# Patient Record
Sex: FEMALE | Race: BLACK OR AFRICAN AMERICAN | NOT HISPANIC OR LATINO | Employment: FULL TIME | URBAN - METROPOLITAN AREA
[De-identification: names, ages, dates, MRNs, and addresses within clinical notes are randomized per-mention and may not be internally consistent; named-entity substitution may affect disease eponyms.]

---

## 2021-01-05 ENCOUNTER — DOCUMENTATION ONLY (OUTPATIENT)
Dept: ONCOLOGY | Facility: CLINIC | Age: 51
End: 2021-01-05

## 2021-01-05 ENCOUNTER — TRANSCRIBE ORDERS (OUTPATIENT)
Dept: OTHER | Age: 51
End: 2021-01-05

## 2021-01-05 DIAGNOSIS — D70.9 NEUTROPENIA, UNSPECIFIED (H): Primary | ICD-10-CM

## 2021-01-05 NOTE — PROGRESS NOTES
Action    Action Taken 1/5/21: Records from [facility] pulled thru [facility]     RECORDS STATUS - ALL OTHER DIAGNOSIS      RECORDS RECEIVED FROM: [facility]   DATE RECEIVED:    NOTES STATUS DETAILS   OFFICE NOTE from referring provider GLEN - [facility]: 12/31/20   OFFICE NOTE from medical oncologist     DISCHARGE SUMMARY from hospital     DISCHARGE REPORT from the ER     OPERATIVE REPORT     MEDICATION LIST     CLINICAL TRIAL TREATMENTS TO DATE     LABS     PATHOLOGY REPORTS     ANYTHING RELATED TO DIAGNOSIS Epic/[facility] 12/17/20   GENONOMIC TESTING     TYPE:     IMAGING (NEED IMAGES & REPORT)     XR Requested 1/5 12/28/20: [facility]    12/10/20: [facility]   CT SCANS     MRI Requested 1/5 12/26/20: [facility]   MAMMO     ULTRASOUND Requested 1/5 12/26/20: [facility]   PET

## 2023-10-05 ENCOUNTER — APPOINTMENT (OUTPATIENT)
Dept: CT IMAGING | Facility: CLINIC | Age: 53
End: 2023-10-05
Attending: EMERGENCY MEDICINE
Payer: COMMERCIAL

## 2023-10-05 ENCOUNTER — APPOINTMENT (OUTPATIENT)
Dept: ULTRASOUND IMAGING | Facility: CLINIC | Age: 53
End: 2023-10-05
Attending: EMERGENCY MEDICINE
Payer: COMMERCIAL

## 2023-10-05 ENCOUNTER — HOSPITAL ENCOUNTER (EMERGENCY)
Facility: CLINIC | Age: 53
Discharge: HOME | End: 2023-10-05
Attending: EMERGENCY MEDICINE | Admitting: EMERGENCY MEDICINE
Payer: COMMERCIAL

## 2023-10-05 VITALS
RESPIRATION RATE: 18 BRPM | SYSTOLIC BLOOD PRESSURE: 126 MMHG | DIASTOLIC BLOOD PRESSURE: 80 MMHG | TEMPERATURE: 97 F | OXYGEN SATURATION: 99 % | WEIGHT: 159.17 LBS | HEART RATE: 64 BPM

## 2023-10-05 DIAGNOSIS — M79.89 LEFT LEG SWELLING: ICD-10-CM

## 2023-10-05 DIAGNOSIS — R91.8 PULMONARY NODULES: ICD-10-CM

## 2023-10-05 DIAGNOSIS — R07.9 CHEST PAIN, UNSPECIFIED TYPE: Primary | ICD-10-CM

## 2023-10-05 LAB
ALBUMIN SERPL BCG-MCNC: 4.4 G/DL (ref 3.5–5.2)
ALP SERPL-CCNC: 136 U/L (ref 35–104)
ALT SERPL W P-5'-P-CCNC: 15 U/L (ref 0–50)
ANION GAP SERPL CALCULATED.3IONS-SCNC: 7 MMOL/L (ref 7–15)
AST SERPL W P-5'-P-CCNC: 19 U/L (ref 0–45)
BASO+EOS+MONOS # BLD AUTO: ABNORMAL 10*3/UL
BASO+EOS+MONOS NFR BLD AUTO: ABNORMAL %
BASOPHILS # BLD AUTO: 0 10E3/UL (ref 0–0.2)
BASOPHILS NFR BLD AUTO: 1 %
BILIRUB SERPL-MCNC: 0.5 MG/DL
BUN SERPL-MCNC: 12.9 MG/DL (ref 6–20)
CALCIUM SERPL-MCNC: 9.1 MG/DL (ref 8.6–10)
CHLORIDE SERPL-SCNC: 105 MMOL/L (ref 98–107)
CREAT SERPL-MCNC: 0.62 MG/DL (ref 0.51–0.95)
DEPRECATED HCO3 PLAS-SCNC: 29 MMOL/L (ref 22–29)
EGFRCR SERPLBLD CKD-EPI 2021: >90 ML/MIN/1.73M2
EOSINOPHIL # BLD AUTO: 0.2 10E3/UL (ref 0–0.7)
EOSINOPHIL NFR BLD AUTO: 7 %
ERYTHROCYTE [DISTWIDTH] IN BLOOD BY AUTOMATED COUNT: 12.6 % (ref 10–15)
GLUCOSE SERPL-MCNC: 89 MG/DL (ref 70–99)
HCT VFR BLD AUTO: 38.7 % (ref 35–47)
HGB BLD-MCNC: 12.7 G/DL (ref 11.7–15.7)
HOLD SPECIMEN: NORMAL
HOLD SPECIMEN: NORMAL
IMM GRANULOCYTES # BLD: 0 10E3/UL
IMM GRANULOCYTES NFR BLD: 0 %
LYMPHOCYTES # BLD AUTO: 2 10E3/UL (ref 0.8–5.3)
LYMPHOCYTES NFR BLD AUTO: 58 %
MCH RBC QN AUTO: 29.3 PG (ref 26.5–33)
MCHC RBC AUTO-ENTMCNC: 32.8 G/DL (ref 31.5–36.5)
MCV RBC AUTO: 89 FL (ref 78–100)
MONOCYTES # BLD AUTO: 0.3 10E3/UL (ref 0–1.3)
MONOCYTES NFR BLD AUTO: 9 %
NEUTROPHILS # BLD AUTO: 0.8 10E3/UL (ref 1.6–8.3)
NEUTROPHILS NFR BLD AUTO: 25 %
NRBC # BLD AUTO: 0 10E3/UL
NRBC BLD AUTO-RTO: 0 /100
NT-PROBNP SERPL-MCNC: 47 PG/ML (ref 0–900)
PLATELET # BLD AUTO: 225 10E3/UL (ref 150–450)
POTASSIUM SERPL-SCNC: 3.8 MMOL/L (ref 3.4–5.3)
PROT SERPL-MCNC: 7.1 G/DL (ref 6.4–8.3)
RBC # BLD AUTO: 4.33 10E6/UL (ref 3.8–5.2)
SODIUM SERPL-SCNC: 141 MMOL/L (ref 135–145)
TROPONIN T SERPL HS-MCNC: <6 NG/L
TROPONIN T SERPL HS-MCNC: <6 NG/L
WBC # BLD AUTO: 3.4 10E3/UL (ref 4–11)

## 2023-10-05 PROCEDURE — 80053 COMPREHEN METABOLIC PANEL: CPT | Performed by: EMERGENCY MEDICINE

## 2023-10-05 PROCEDURE — 99285 EMERGENCY DEPT VISIT HI MDM: CPT | Mod: 25

## 2023-10-05 PROCEDURE — 83880 ASSAY OF NATRIURETIC PEPTIDE: CPT | Performed by: EMERGENCY MEDICINE

## 2023-10-05 PROCEDURE — 36415 COLL VENOUS BLD VENIPUNCTURE: CPT | Performed by: EMERGENCY MEDICINE

## 2023-10-05 PROCEDURE — 93970 EXTREMITY STUDY: CPT

## 2023-10-05 PROCEDURE — 250N000011 HC RX IP 250 OP 636: Performed by: EMERGENCY MEDICINE

## 2023-10-05 PROCEDURE — 84484 ASSAY OF TROPONIN QUANT: CPT | Performed by: EMERGENCY MEDICINE

## 2023-10-05 PROCEDURE — 250N000009 HC RX 250: Performed by: EMERGENCY MEDICINE

## 2023-10-05 PROCEDURE — 71275 CT ANGIOGRAPHY CHEST: CPT

## 2023-10-05 PROCEDURE — 93005 ELECTROCARDIOGRAM TRACING: CPT

## 2023-10-05 PROCEDURE — 85025 COMPLETE CBC W/AUTO DIFF WBC: CPT | Performed by: EMERGENCY MEDICINE

## 2023-10-05 RX ORDER — IOPAMIDOL 755 MG/ML
500 INJECTION, SOLUTION INTRAVASCULAR ONCE
Status: COMPLETED | OUTPATIENT
Start: 2023-10-05 | End: 2023-10-05

## 2023-10-05 RX ADMIN — SODIUM CHLORIDE 78 ML: 9 INJECTION, SOLUTION INTRAVENOUS at 14:48

## 2023-10-05 RX ADMIN — IOPAMIDOL 80 ML: 755 INJECTION, SOLUTION INTRAVENOUS at 14:48

## 2023-10-05 ASSESSMENT — ACTIVITIES OF DAILY LIVING (ADL)
ADLS_ACUITY_SCORE: 35
ADLS_ACUITY_SCORE: 35

## 2023-10-05 ASSESSMENT — ENCOUNTER SYMPTOMS
FEVER: 0
NAUSEA: 0
CHILLS: 0
HEMATURIA: 0
HEADACHES: 1
SHORTNESS OF BREATH: 0
COUGH: 0
LIGHT-HEADEDNESS: 0
DYSURIA: 0
DIZZINESS: 0
NECK STIFFNESS: 0
RHINORRHEA: 0
ABDOMINAL PAIN: 0
NUMBNESS: 0
NECK PAIN: 0

## 2023-10-05 NOTE — ED PROVIDER NOTES
History     Chief Complaint:  Chest Pain       The history is provided by the patient. No  was used.      Gregory Richard is a 52 year old female who presents with sharp, intermittent chest pain. Patient reports she experienced severe chest pain that began yesterday at 2100 with a 6/10 severity. She reports her chest pain radiates to her mid-back, but that has improved and currently experiences 1/10 pain. Patient reports this morning she woke up with a single small blood clot on the side of her mouth. She explains the blood was minimal. She is uncertain of the origin, but denies it being from cough or vomitus. Patient reports her pain does not exacerbate or improve with anything. Patient visited urgent care earlier today and was given aspirin, and recommended to the ED. She notes that she experienced a similar pain one year prior and was evaluated but no cause of pain was found. Reports history of hyperlipidemia and denies any history of heart or lung problems. Patient does not take any routine medications. Denies lightheadedness but feels a light headache. No family history of heart stents, bypass, MI or stroke under the age of 60. Never had a stress test in the past. She denies smoking. She denies hemoptysis. No history of aortic disease or PE/DVT.    Incidentally, she also notes that she has been experience lower extremity swelling bilaterally, and an odd sensation internally that she cannot describe. Sometimes there's associated pruritus worse at night time.    Review of Systems   Constitutional:  Negative for chills and fever.   HENT:  Negative for congestion and rhinorrhea.    Respiratory:  Negative for cough and shortness of breath.    Cardiovascular:  Positive for chest pain and leg swelling.   Gastrointestinal:  Negative for abdominal pain and nausea.   Genitourinary:  Negative for dysuria and hematuria.   Musculoskeletal:  Negative for neck pain and neck stiffness.   Neurological:   Positive for headaches. Negative for dizziness, light-headedness and numbness.   All other systems reviewed and are negative.      Independent Historian:   None - Patient Only    Review of External Notes:   [facility] visit note from today     Medications:    The patient is currently on no regular medications.    Past Medical History:    Hyperlipidemia     Physical Exam   Patient Vitals for the past 24 hrs:   BP Temp Temp src Pulse Resp SpO2 Weight   10/05/23 1805 126/80 -- -- 64 18 99 % --   10/05/23 1441 109/67 -- -- 54 16 100 % --   10/05/23 1201 116/81 97  F (36.1  C) Temporal 65 18 100 % 72.2 kg (159 lb 2.8 oz)      Constitutional:       General: Not in acute distress.     Appearance: Normal appearance.   HENT:      Head: Normocephalic and atraumatic.   Eyes:      Extraocular Movements: Extraocular movements intact.      Conjunctiva/sclera: Conjunctivae normal.   Cardiovascular:      Rate and Rhythm: Regular rate and rhythm.  Pulmonary:      Effort: Pulmonary effort is normal. No respiratory distress.      Breath sounds: Normal breath sounds.  Abdominal:      General: Abdomen is flat. There is no distension.      Palpations: Abdomen is soft.      Tenderness: There is no abdominal tenderness.   Musculoskeletal:      Cervical back: Normal range of motion. No rigidity.      Right lower leg: No edema.      Left lower leg: No edema.   Skin:     General: Skin is warm and dry.   Neurological:      General: No focal deficit present.      Mental Status: Alert and oriented to person, place, and time.   Psychiatric:         Mood and Affect: Mood normal.         Behavior: Behavior normal.    Emergency Department Course   ECG   See ED course for independent interpretation.     Imaging:  US Lower Extremity Venous Duplex Bilateral   Final Result   IMPRESSION:   1.  No deep venous thrombosis in the bilateral lower extremities.      NITISH GERBER MD            SYSTEM ID:  [id number]      CT Chest Pulmonary Embolism w Contrast   Final  Result   IMPRESSION:   1.  No pulmonary artery embolism.   2.  No acute pulmonary findings. No pneumonic infiltrate or pleural   effusion.   3.  Bilateral 3 to 4 mm pulmonary nodules. These can be followed per   the guidelines below.         REFERENCE:   Guidelines for Management of Incidental Pulmonary Nodules Detected on   CT Images: From the Fleischner Society 2017.    Guidelines apply to incidental nodules in patients who are 35 years or   older.   Guidelines do not apply to lung cancer screening, patients with   immunosuppression, or patients with known primary cancer.      MULTIPLE NODULES   Nodule size <6 mm   Low-risk patients: No follow-up needed.   High-risk patients: Optional follow-up at 12 months.            LISETTE VARGAS MD            SYSTEM ID:  [id number]         Report per radiology    Laboratory:  Labs Ordered and Resulted from Time of ED Arrival to Time of ED Departure   COMPREHENSIVE METABOLIC PANEL - Abnormal       Result Value    Sodium 141      Potassium 3.8      Carbon Dioxide (CO2) 29      Anion Gap 7      Urea Nitrogen 12.9      Creatinine 0.62      GFR Estimate >90      Calcium 9.1      Chloride 105      Glucose 89      Alkaline Phosphatase 136 (*)     AST 19      ALT 15      Protein Total 7.1      Albumin 4.4      Bilirubin Total 0.5     CBC WITH PLATELETS AND DIFFERENTIAL - Abnormal    WBC Count 3.4 (*)     RBC Count 4.33      Hemoglobin 12.7      Hematocrit 38.7      MCV 89      MCH 29.3      MCHC 32.8      RDW 12.6      Platelet Count 225      % Neutrophils 25      % Lymphocytes 58      % Monocytes 9      Mids % (Monos, Eos, Basos)        % Eosinophils 7      % Basophils 1      % Immature Granulocytes 0      NRBCs per 100 WBC 0      Absolute Neutrophils 0.8 (*)     Absolute Lymphocytes 2.0      Absolute Monocytes 0.3      Mids Abs (Monos, Eos, Basos)        Absolute Eosinophils 0.2      Absolute Basophils 0.0      Absolute Immature Granulocytes 0.0      Absolute NRBCs 0.0      TROPONIN T, HIGH SENSITIVITY - Normal    Troponin T, High Sensitivity <6     NT PROBNP INPATIENT - Normal    N terminal Pro BNP Inpatient 47     TROPONIN T, HIGH SENSITIVITY - Normal    Troponin T, High Sensitivity <6        Emergency Department Course & Assessments:       Interventions:  Medications   CT Scan Flush (78 mLs Intravenous $Given 10/5/23 1448)   iopamidol (ISOVUE-370) solution 500 mL (80 mLs Intravenous $Given 10/5/23 1448)        Independent Interpretation (X-rays, CTs, rhythm strip):  None     Assessments/Consultations/Discussion of Management or Tests:  ED Course as of 10/05/23 2159   Thu Oct 05, 2023   1410 EKG 12-lead, tracing only  Normal sinus rhythm.  Rate of 58.  Normal CO and QRS.  Normal QTc.  No acute ST elevation or depression.  No prior ECG for comparison.   1412 I obtained history and examined the patient as noted above.      1744 I updated the patient and  regarding patient's lab and image findings.  Patient not complaining of any chest pain at this time and feels well.  Heart score of 3.  Updated on incidental finding of pulmonary nodules, will discharge patient to primary care follow-up.  No indication for stress testing or cardiology referral at this time and patient and  declined as well. Unclear cause of chest pain today, but no evidence of PE, dissection, or ACS. Discussed return precautions.  Answered all questions.  Patient and  voiced understanding agreement with plan.     Social Determinants of Health affecting care:   None    Disposition:  The patient was discharged to home.     Impression & Plan    CMS Diagnoses: None    HEART Score  Background  Calculates the overall risk of adverse event in patient's presenting with chest pain.  Based on 5 criteria (each assigned 0-2 points) including suspiciousness of history, EKG, age, risk factors and troponin.    Data  52 year old female  does not have a problem list on file.   has no history on file for tobacco  use.  family history is not on file.  Recent Labs   Lab 10/05/23  1558 10/05/23  1434   CTROPT <6 <6     Criteria   0-2 points for each of 5 items (maximum of 10 points):  Score 1- History moderately suspicious for coronary syndrome  Score 0- EKG Normal  Score 1- Age 45 to 65 years old  Score 1- One to 2 risk factors for atherosclerotic disease  Score 0- Within normal limits for troponin levels  Interpretation  Risk of adverse outcome  Heart Score: 3  Total Score 0-3- Adverse Outcome Risk 2.5% - Supports early discharge with appropriate follow-up    Medical Decision Makin-year-old female as described above presents to the emergency department for chest pain with radiation to back.  Patient has had similar pains in the past before and was evaluated at outpatient facility and was told to have negative work-up.  Patient hemodynamically stable at time evaluation.  Afebrile.  Currently the chest pain significantly improved.  Received aspirin at outside facility.  Broad differential diagnosis considered includes, but not limited to, pulmonary embolism, thoracic aortic dissection, ACS, and lung malignancy.  Will obtain CT PE for evaluation of above discussed differentials.  2 set cardiac enzymes.  Further restratification due to heart score pending troponin and completion work-up.  BNP given lower extremity swelling.  Ultrasound duplex of lower extremity given on cramp-like sensation in lower extremities.  Discussed care plan with patient who voiced understanding and agreement with plan.  Answered all questions.  Additional work-up and orders as listed in chart.    Please refer to ED course above for details on the patient's treatment course and any changes or updates in care plan beyond my initial evaluation and MDM.    Diagnosis:    ICD-10-CM    1. Chest pain, unspecified type  R07.9       2. Pulmonary nodules  R91.8 Adult Oncology/Hematology  Referral      3. Left leg swelling  M79.89          Scribe  Disclosure:  I, Jewels William, am serving as a scribe at 3:17 PM on 10/5/2023 to document services personally performed by Patrick Oliver DO based on my observations and the provider's statements to me.     I, Starr Graham, am serving as a scribe at 4:40 PM on 10/5/2023 to document services personally performed by Patrick Oliver DO based on my observations and the provider's statements to me.    10/5/2023   Patrick Oliver DO Yeh, Ferris, DO  10/05/23 2150

## 2023-10-05 NOTE — ED TRIAGE NOTES
Patient reports with chest pain since last night at 9 pm. She describes the chest pain as sharp and radiating, substernal radiating to her shoulder, The chest pain was severe last night and has since subsided rates the pain 6/10. Denies shortness or dizziness.

## 2023-10-06 ENCOUNTER — PATIENT OUTREACH (OUTPATIENT)
Dept: ONCOLOGY | Facility: CLINIC | Age: 53
End: 2023-10-06
Payer: COMMERCIAL

## 2023-10-06 LAB
ATRIAL RATE - MUSE: 58 BPM
DIASTOLIC BLOOD PRESSURE - MUSE: NORMAL MMHG
INTERPRETATION ECG - MUSE: NORMAL
P AXIS - MUSE: 31 DEGREES
PR INTERVAL - MUSE: 146 MS
QRS DURATION - MUSE: 82 MS
QT - MUSE: 392 MS
QTC - MUSE: 384 MS
R AXIS - MUSE: 32 DEGREES
SYSTOLIC BLOOD PRESSURE - MUSE: NORMAL MMHG
T AXIS - MUSE: 31 DEGREES
VENTRICULAR RATE- MUSE: 58 BPM

## 2023-10-06 NOTE — PROGRESS NOTES
New IP (Interventional Pulmonology) referral rec'd.  Chart reviewed.      New Patient: Interventional Pulmonary (Lung nodule) Nurse Navigator Note    Referring provider:   Patrick Oliver DO [facility] [phone]     Diagnoses      Referred to (specialty): Interventional Pulmonary (Lung nodule)    Requested provider (if applicable): n/a    Date Referral Received: 10/6/2023    Evaluation for :  Lung nodule    Clinical History (per Nurse review of records provided):    **BOOK MARKED**  CT CHEST PULMONARY EMBOLISM W CONTRAST 10/5/2023 2:58 PM     CLINICAL HISTORY: pleuritic/sharp chest pain with radiation to back  TECHNIQUE: CT angiogram chest during arterial phase injection IV  contrast. 2D and 3D MIP reconstructions were performed by the CT  technologist. Dose reduction techniques were used.      CONTRAST: 80mL Isovue-370     COMPARISON: None.     FINDINGS:  ANGIOGRAM CHEST: Pulmonary arteries are normal caliber and negative  for pulmonary emboli. Thoracic aorta is negative for dissection. No CT  evidence of right heart strain. Incidental ductus diverticulum of the  thoracic aorta.     LUNGS AND PLEURA: 4 mm right middle lobe nodules image numbers 118 and  156 series 7. 3 mm left lower lobe subpleural nodule image 172. 4 mm  left upper lobe nodule image 86. 3 mm left major fissural  intrapulmonary lymph node. Mild bibasilar scarring. No focal pneumonic  consolidation or pleural effusion. No pneumothorax.     MEDIASTINUM/AXILLAE: No thoracic adenopathy. Normal heart size and no  pericardial effusion.     CORONARY ARTERY CALCIFICATION: None.     UPPER ABDOMEN: Normal.     MUSCULOSKELETAL: Normal.                                                                      IMPRESSION:  1.  No pulmonary artery embolism.  2.  No acute pulmonary findings. No pneumonic infiltrate or pleural  effusion.  3.  Bilateral 3 to 4 mm pulmonary nodules. These can be followed per  the guidelines  below.      Records Location (Care Everywhere, Media, etc.): Epic     Records Needed: none    Additional testing needed prior to consult: NONE

## 2023-10-10 PROBLEM — M53.3 COCCYX PAIN: Status: ACTIVE | Noted: 2019-06-14

## 2023-10-10 PROBLEM — R60.0 PEDAL EDEMA: Status: ACTIVE | Noted: 2019-06-14

## 2023-10-10 PROBLEM — M75.102 TEAR OF LEFT SUPRASPINATUS TENDON: Status: ACTIVE | Noted: 2021-01-01

## 2023-10-10 RX ORDER — IBUPROFEN 600 MG/1
600 TABLET, FILM COATED ORAL
COMMUNITY
Start: 2023-09-26

## 2023-10-14 ENCOUNTER — HEALTH MAINTENANCE LETTER (OUTPATIENT)
Age: 53
End: 2023-10-14

## 2023-11-16 NOTE — TELEPHONE ENCOUNTER
RECORDS STATUS - ALL OTHER DIAGNOSIS      [facility]   DATE RECEIVED: 11/16   NOTES STATUS DETAILS   OFFICE NOTE from referring provider Epic Dr. Patrick Oliver via ED visit 10/5/23   DISCHARGE REPORT from the ER [facility] 10/5/23   MEDICATION LIST [facility] 9/26/23   LABS     ANYTHING RELATED TO DIAGNOSIS Epic/ 10/27/23   IMAGING (NEED IMAGES & REPORT)     CT SCANS PACS 10/5/23: [facility]   MRI PACS 7/3/23, 5/24/22, 12/28/12: [facility]

## 2023-11-20 PROBLEM — R73.03 PRE-DIABETES: Status: ACTIVE | Noted: 2023-10-29

## 2023-11-20 PROBLEM — D72.819 LEUKOPENIA: Status: ACTIVE | Noted: 2023-10-27

## 2023-11-20 PROBLEM — R91.1 PULMONARY NODULE: Status: ACTIVE | Noted: 2023-10-27

## 2023-11-20 PROBLEM — I95.0 IDIOPATHIC HYPOTENSION: Status: ACTIVE | Noted: 2023-10-27

## 2023-12-07 ENCOUNTER — PRE VISIT (OUTPATIENT)
Dept: PULMONOLOGY | Facility: CLINIC | Age: 53
End: 2023-12-07
Payer: COMMERCIAL

## 2023-12-08 ENCOUNTER — PATIENT OUTREACH (OUTPATIENT)
Dept: ONCOLOGY | Facility: CLINIC | Age: 53
End: 2023-12-08
Payer: COMMERCIAL

## 2023-12-08 NOTE — PROGRESS NOTES
I rec'd a message that this patient was a no show.  I sent an IB message to the NPS (new patient scheduling) team asking that they reach out to reschedule patient for another appt --ok to schedule 2-3 months out, non-urgent

## 2023-12-21 NOTE — TELEPHONE ENCOUNTER
RECORDS STATUS - ALL OTHER DIAGNOSIS             [facility]   DATE RECEIVED: 12/21   NOTES STATUS DETAILS   OFFICE NOTE from referring provider Epic Dr. Patrick Oliver via ED visit 10/5/23   DISCHARGE REPORT from the ER [facility] 10/5/23   MEDICATION LIST [facility] 9/26/23   LABS       ANYTHING RELATED TO DIAGNOSIS Epic/ 10/27/23   IMAGING (NEED IMAGES & REPORT)       CT SCANS PACS 10/5/23: [facility]   MRI PACS 7/3/23, 5/24/22, 12/28/12: [facility]

## 2024-03-06 DIAGNOSIS — R91.1 PULMONARY NODULE: Primary | ICD-10-CM

## 2024-03-07 ENCOUNTER — CARE COORDINATION (OUTPATIENT)
Dept: PULMONOLOGY | Facility: CLINIC | Age: 54
End: 2024-03-07
Payer: COMMERCIAL

## 2024-03-07 NOTE — PROGRESS NOTES
"Upon chart review for upcoming appointment on 3/20 with Dr. Alas, RNCC noted that pt did not have CT within the past 3 months. [facility]. Msg received from  stating that patient did not want to schedule the CT because \"it is too expensive.\" RNCC reviewed patients chart further and per appointment on 10/27/2023 (post ED follow up) with Luisana Wise PA-C, provider wrote in her office summary: \"Discussed no follow up needed for pulmonary nodules, pt may request follow up in 1 year anyway. This may not be covered by insurance.\"    Msg sent to Dr. Alas who is set to see patient on 3/20 regarding above. Dr. Alas stated \"She could follow-up with her PCP. Thanks Damir\"    MyChart message sent to pt regarding no need to follow-up with IP clinic and pt can follow-up with pcp. Msg sent to scheduling team to cancel 3/20 appointment. Msg sent to ASHWIN Pa regarding the pt not needing follow-up with IP clinic and referral can be canceled.   "

## 2024-03-20 ENCOUNTER — PRE VISIT (OUTPATIENT)
Dept: PULMONOLOGY | Facility: CLINIC | Age: 54
End: 2024-03-20
Payer: COMMERCIAL

## 2024-07-21 ENCOUNTER — HOSPITAL ENCOUNTER (EMERGENCY)
Facility: CLINIC | Age: 54
Discharge: HOME | End: 2024-07-21
Attending: EMERGENCY MEDICINE | Admitting: EMERGENCY MEDICINE
Payer: COMMERCIAL

## 2024-07-21 ENCOUNTER — APPOINTMENT (OUTPATIENT)
Dept: CT IMAGING | Facility: CLINIC | Age: 54
End: 2024-07-21
Attending: EMERGENCY MEDICINE
Payer: COMMERCIAL

## 2024-07-21 ENCOUNTER — APPOINTMENT (OUTPATIENT)
Dept: MRI IMAGING | Facility: CLINIC | Age: 54
End: 2024-07-21
Attending: EMERGENCY MEDICINE
Payer: COMMERCIAL

## 2024-07-21 VITALS
DIASTOLIC BLOOD PRESSURE: 70 MMHG | HEIGHT: 64 IN | HEART RATE: 60 BPM | BODY MASS INDEX: 28 KG/M2 | TEMPERATURE: 97.7 F | RESPIRATION RATE: 10 BRPM | OXYGEN SATURATION: 100 % | WEIGHT: 164 LBS | SYSTOLIC BLOOD PRESSURE: 125 MMHG

## 2024-07-21 DIAGNOSIS — M54.2 NECK PAIN: ICD-10-CM

## 2024-07-21 DIAGNOSIS — R11.2 NAUSEA AND VOMITING, UNSPECIFIED VOMITING TYPE: ICD-10-CM

## 2024-07-21 DIAGNOSIS — R51.9 ACUTE NONINTRACTABLE HEADACHE, UNSPECIFIED HEADACHE TYPE: ICD-10-CM

## 2024-07-21 DIAGNOSIS — R42 VERTIGO: ICD-10-CM

## 2024-07-21 LAB
ANION GAP SERPL CALCULATED.3IONS-SCNC: 10 MMOL/L (ref 7–15)
APTT PPP: 34 SECONDS (ref 22–38)
BASOPHILS # BLD AUTO: 0 10E3/UL (ref 0–0.2)
BASOPHILS NFR BLD AUTO: 1 %
BUN SERPL-MCNC: 13.4 MG/DL (ref 6–20)
CALCIUM SERPL-MCNC: 8.6 MG/DL (ref 8.8–10.4)
CHLORIDE SERPL-SCNC: 102 MMOL/L (ref 98–107)
CREAT SERPL-MCNC: 0.64 MG/DL (ref 0.51–0.95)
EGFRCR SERPLBLD CKD-EPI 2021: >90 ML/MIN/1.73M2
EOSINOPHIL # BLD AUTO: 0.4 10E3/UL (ref 0–0.7)
EOSINOPHIL NFR BLD AUTO: 10 %
ERYTHROCYTE [DISTWIDTH] IN BLOOD BY AUTOMATED COUNT: 12.5 % (ref 10–15)
FLUAV RNA SPEC QL NAA+PROBE: NEGATIVE
FLUBV RNA RESP QL NAA+PROBE: NEGATIVE
GLUCOSE BLDC GLUCOMTR-MCNC: 100 MG/DL (ref 70–99)
GLUCOSE SERPL-MCNC: 110 MG/DL (ref 70–99)
HCG SERPL QL: NEGATIVE
HCO3 SERPL-SCNC: 26 MMOL/L (ref 22–29)
HCT VFR BLD AUTO: 39.9 % (ref 35–47)
HGB BLD-MCNC: 12.7 G/DL (ref 11.7–15.7)
IMM GRANULOCYTES # BLD: 0 10E3/UL
IMM GRANULOCYTES NFR BLD: 0 %
INR PPP: 1 (ref 0.85–1.15)
LYMPHOCYTES # BLD AUTO: 2 10E3/UL (ref 0.8–5.3)
LYMPHOCYTES NFR BLD AUTO: 53 %
MCH RBC QN AUTO: 28.5 PG (ref 26.5–33)
MCHC RBC AUTO-ENTMCNC: 31.8 G/DL (ref 31.5–36.5)
MCV RBC AUTO: 90 FL (ref 78–100)
MONOCYTES # BLD AUTO: 0.4 10E3/UL (ref 0–1.3)
MONOCYTES NFR BLD AUTO: 10 %
NEUTROPHILS # BLD AUTO: 1 10E3/UL (ref 1.6–8.3)
NEUTROPHILS NFR BLD AUTO: 27 %
NRBC # BLD AUTO: 0 10E3/UL
NRBC BLD AUTO-RTO: 0 /100
PLATELET # BLD AUTO: 230 10E3/UL (ref 150–450)
POTASSIUM SERPL-SCNC: 3.9 MMOL/L (ref 3.4–5.3)
RBC # BLD AUTO: 4.45 10E6/UL (ref 3.8–5.2)
RSV RNA SPEC NAA+PROBE: NEGATIVE
SARS-COV-2 RNA RESP QL NAA+PROBE: NEGATIVE
SODIUM SERPL-SCNC: 138 MMOL/L (ref 135–145)
TROPONIN T SERPL HS-MCNC: 10 NG/L
WBC # BLD AUTO: 3.7 10E3/UL (ref 4–11)

## 2024-07-21 PROCEDURE — 85025 COMPLETE CBC W/AUTO DIFF WBC: CPT | Performed by: EMERGENCY MEDICINE

## 2024-07-21 PROCEDURE — 36415 COLL VENOUS BLD VENIPUNCTURE: CPT | Performed by: EMERGENCY MEDICINE

## 2024-07-21 PROCEDURE — 70496 CT ANGIOGRAPHY HEAD: CPT

## 2024-07-21 PROCEDURE — 255N000002 HC RX 255 OP 636: Performed by: EMERGENCY MEDICINE

## 2024-07-21 PROCEDURE — 82962 GLUCOSE BLOOD TEST: CPT

## 2024-07-21 PROCEDURE — 250N000009 HC RX 250: Performed by: EMERGENCY MEDICINE

## 2024-07-21 PROCEDURE — 250N000011 HC RX IP 250 OP 636: Performed by: EMERGENCY MEDICINE

## 2024-07-21 PROCEDURE — 70450 CT HEAD/BRAIN W/O DYE: CPT | Mod: XU

## 2024-07-21 PROCEDURE — 85610 PROTHROMBIN TIME: CPT | Performed by: EMERGENCY MEDICINE

## 2024-07-21 PROCEDURE — 250N000013 HC RX MED GY IP 250 OP 250 PS 637: Performed by: EMERGENCY MEDICINE

## 2024-07-21 PROCEDURE — 96374 THER/PROPH/DIAG INJ IV PUSH: CPT | Mod: 59

## 2024-07-21 PROCEDURE — 84703 CHORIONIC GONADOTROPIN ASSAY: CPT | Performed by: EMERGENCY MEDICINE

## 2024-07-21 PROCEDURE — A9585 GADOBUTROL INJECTION: HCPCS | Performed by: EMERGENCY MEDICINE

## 2024-07-21 PROCEDURE — 70553 MRI BRAIN STEM W/O & W/DYE: CPT

## 2024-07-21 PROCEDURE — 85730 THROMBOPLASTIN TIME PARTIAL: CPT | Performed by: EMERGENCY MEDICINE

## 2024-07-21 PROCEDURE — 87637 SARSCOV2&INF A&B&RSV AMP PRB: CPT | Performed by: EMERGENCY MEDICINE

## 2024-07-21 PROCEDURE — 80048 BASIC METABOLIC PNL TOTAL CA: CPT | Performed by: EMERGENCY MEDICINE

## 2024-07-21 PROCEDURE — 99207 PR NO BILLABLE SERVICE THIS VISIT: CPT | Performed by: STUDENT IN AN ORGANIZED HEALTH CARE EDUCATION/TRAINING PROGRAM

## 2024-07-21 PROCEDURE — 84484 ASSAY OF TROPONIN QUANT: CPT | Performed by: EMERGENCY MEDICINE

## 2024-07-21 PROCEDURE — 96361 HYDRATE IV INFUSION ADD-ON: CPT

## 2024-07-21 PROCEDURE — 93005 ELECTROCARDIOGRAM TRACING: CPT

## 2024-07-21 PROCEDURE — 99285 EMERGENCY DEPT VISIT HI MDM: CPT | Mod: 25

## 2024-07-21 PROCEDURE — 258N000003 HC RX IP 258 OP 636: Performed by: EMERGENCY MEDICINE

## 2024-07-21 RX ORDER — MECLIZINE HYDROCHLORIDE 25 MG/1
25 TABLET ORAL EVERY 6 HOURS PRN
Qty: 12 TABLET | Refills: 0 | Status: SHIPPED | OUTPATIENT
Start: 2024-07-21 | End: 2024-07-24

## 2024-07-21 RX ORDER — ONDANSETRON 2 MG/ML
4 INJECTION INTRAMUSCULAR; INTRAVENOUS ONCE
Status: COMPLETED | OUTPATIENT
Start: 2024-07-21 | End: 2024-07-21

## 2024-07-21 RX ORDER — DIAZEPAM 10 MG/2ML
2.5 INJECTION, SOLUTION INTRAMUSCULAR; INTRAVENOUS ONCE
Status: COMPLETED | OUTPATIENT
Start: 2024-07-21 | End: 2024-07-21

## 2024-07-21 RX ORDER — MECLIZINE HYDROCHLORIDE 25 MG/1
25 TABLET ORAL ONCE
Status: COMPLETED | OUTPATIENT
Start: 2024-07-21 | End: 2024-07-21

## 2024-07-21 RX ORDER — IOPAMIDOL 755 MG/ML
67 INJECTION, SOLUTION INTRAVASCULAR ONCE
Status: COMPLETED | OUTPATIENT
Start: 2024-07-21 | End: 2024-07-21

## 2024-07-21 RX ORDER — ONDANSETRON 4 MG/1
4 TABLET, ORALLY DISINTEGRATING ORAL EVERY 8 HOURS PRN
Qty: 10 TABLET | Refills: 0 | Status: SHIPPED | OUTPATIENT
Start: 2024-07-21 | End: 2024-07-24

## 2024-07-21 RX ORDER — GADOBUTROL 604.72 MG/ML
7.5 INJECTION INTRAVENOUS ONCE
Status: COMPLETED | OUTPATIENT
Start: 2024-07-21 | End: 2024-07-21

## 2024-07-21 RX ADMIN — SODIUM CHLORIDE, PRESERVATIVE FREE 100 ML: 5 INJECTION INTRAVENOUS at 10:11

## 2024-07-21 RX ADMIN — MECLIZINE HYDROCHLORIDE 25 MG: 25 TABLET ORAL at 10:44

## 2024-07-21 RX ADMIN — ONDANSETRON 4 MG: 2 INJECTION INTRAMUSCULAR; INTRAVENOUS at 10:40

## 2024-07-21 RX ADMIN — GADOBUTROL 7.5 ML: 604.72 INJECTION INTRAVENOUS at 13:31

## 2024-07-21 RX ADMIN — IOPAMIDOL 67 ML: 755 INJECTION, SOLUTION INTRAVENOUS at 10:10

## 2024-07-21 RX ADMIN — SODIUM CHLORIDE, POTASSIUM CHLORIDE, SODIUM LACTATE AND CALCIUM CHLORIDE 1000 ML: 600; 310; 30; 20 INJECTION, SOLUTION INTRAVENOUS at 10:37

## 2024-07-21 ASSESSMENT — ACTIVITIES OF DAILY LIVING (ADL)
ADLS_ACUITY_SCORE: 35

## 2024-07-21 ASSESSMENT — COLUMBIA-SUICIDE SEVERITY RATING SCALE - C-SSRS
2. HAVE YOU ACTUALLY HAD ANY THOUGHTS OF KILLING YOURSELF IN THE PAST MONTH?: NO
1. IN THE PAST MONTH, HAVE YOU WISHED YOU WERE DEAD OR WISHED YOU COULD GO TO SLEEP AND NOT WAKE UP?: NO
6. HAVE YOU EVER DONE ANYTHING, STARTED TO DO ANYTHING, OR PREPARED TO DO ANYTHING TO END YOUR LIFE?: NO

## 2024-07-21 NOTE — ED PROVIDER NOTES
"  Emergency Department Note      History of Present Illness     Chief Complaint   Dizziness and Vomiting      HPI   Gregory Richard is a 53 year old female who presents to the ED for dizziness and vomiting. ***    Independent Historian   None    Review of External Notes   ***    Past Medical History     Medical History and Problem List   Tear of left supraspinatus tendon  Idiopathic hypotension  Leukopenia  Pre-diabetes  Pulmonary nodule  Endometritis     Medications   Tizanidine     Physical Exam     Patient Vitals for the past 24 hrs:   BP Temp Temp src Pulse Resp SpO2 Height Weight   07/21/24 0944 133/73 97.7  F (36.5  C) Temporal 73 18 98 % 1.626 m (5' 4\") 74.4 kg (164 lb)     Physical Exam  ***    Diagnostics     Lab Results   Labs Ordered and Resulted from Time of ED Arrival to Time of ED Departure   GLUCOSE BY METER - Abnormal       Result Value    GLUCOSE BY METER POCT 100 (*)    GLUCOSE MONITOR NURSING POCT   BASIC METABOLIC PANEL   INR   PARTIAL THROMBOPLASTIN TIME   TROPONIN T, HIGH SENSITIVITY   HCG QUALITATIVE PREGNANCY   INFLUENZA A/B, RSV, & SARS-COV2 PCR   CBC WITH PLATELETS AND DIFFERENTIAL       Imaging   CT Head w/o Contrast    (Results Pending)   CTA Head Neck with Contrast    (Results Pending)       EKG   ECG results from 07/21/24   EKG 12-lead, tracing only     Value    Systolic Blood Pressure     Diastolic Blood Pressure     Ventricular Rate 64    Atrial Rate 64    NC Interval 152    QRS Duration 82        QTc 408    P Axis 20    R AXIS 42    T Axis 28    Interpretation ECG      Sinus rhythm  Normal ECG  When compared with ECG of 05-OCT-2023 12:11,  No significant change was found  ECG taken at 1030, ECG read at 1037       Independent Interpretation   CT Head: No {CT Head:829361}.    ED Course      Medications Administered   Medications   iopamidol (ISOVUE-370) solution 67 mL (has no administration in time range)     And   sodium chloride 0.9 % bag for CT scan flush use (has no " administration in time range)   ondansetron (ZOFRAN) injection 4 mg (has no administration in time range)   meclizine (ANTIVERT) tablet 25 mg (has no administration in time range)   diazepam (VALIUM) injection 2.5 mg (has no administration in time range)   lactated ringers BOLUS 1,000 mL (has no administration in time range)       Procedures   Procedures     Discussion of Management   None    ED Course   ED Course as of 07/21/24 1523   Sun Jul 21, 2024   0957 I obtained history and examined the patient as noted above.    1018 I spoke with Dr. Xavier, of the Stroke Neuro service, regarding the patient.    1424 I rechecked the patient and explained findings. Patient is better.       Optional/Additional Documentation  None    Medical Decision Making / Diagnosis     CMS Diagnoses: None    MIPS       None    MDM   Gregory Richard is a 53 year old female ***    Disposition   The patient was discharged.     Diagnosis   No diagnosis found.     Discharge Medications   New Prescriptions    No medications on file         Scribe Disclosure:  I, Ana Driver, am serving as a scribe at 10:11 AM on 7/21/2024 to document services personally performed by Javier Jackson, * based on my observations and the provider's statements to me.

## 2024-07-21 NOTE — ED TRIAGE NOTES
Pt presents to the ED with complaint of dizziness, nausea, severe headache and neck pain 9/10, and vomiting. Pt states she has intermittent dizziness and had an MRI for neck and head pain on Friday. Pt vomiting in triage. Family holding pt up because pt has loss of balance with dizziness. Pt states she woke up this morning feeling fine at 0630 and at 0730 she became symptomatic.

## 2024-07-21 NOTE — CONSULTS
[facility]    Stroke Telephone Note    I was called by Javier Jackson on 07/21/24 regarding patient Gregory Richard. The patient is a 53 year old female with headache and sneezing for 3 weeks has sudden worsening of symptoms this morning with worsening headache, dizziness, nausea and vomiting    , /73    Vitals  BP: 133/73   Pulse: 73   Resp: 18   Temp: 97.7  F (36.5  C)   Weight: 74.4 kg (164 lb)    Stroke Code Data (for stroke code without tele)  Stroke code activated 07/21/24  1001   Stroke provider first response 07/21/24  1004   Last known normal       Unknown (headache started 3 weeks ago with worsening today morning)   Time of discovery (or onset of symptoms) 07/21/24  0700   Head CT read by Stroke Neuro Provider 07/21/24  1016   Was stroke code de-escalated? Yes  07/21/24  1039     Imaging Findings  CT head: No evidence of acute stroke or intracranial bleed  CTA head/neck: No evidence of LVO or critical stenosis. No obvious dissection    Intravenous Thrombolysis  Not given due to:   - unclear or unfavorable risk-benefit profile for extended window thrombolysis beyond the conventional 4.5 hour time window    Endovascular Treatment  Not initiated due to absence of proximal vessel occlusion    Impression  New onset headache 3 weeks with acute worsening  Dizziness  Nausea/Vomiting    No obvious intracranial bleed, ischemic stroke, vasospasm or dissection visualized far  Unclear etiology of symptoms at this time. Will get further workup    Recommendations   Recommend MRI brain w wo contrast  Infectious/toxic/metabolic workup per primary team    My recommendations are based on the information provided over the phone by Gregory Richard's in-person providers. They are not intended to replace the clinical judgment of her in-person providers. I was not requested to personally see or examine the patient at this time.     Mary Xavier MD  Vascular Neurology    To page me or  "covering stroke neurology team member, click here: [facility]  Choose \"On Call\" tab at top, then select \"NEUROLOGY/ALL SITES\" from middle drop-down box, press Enter, then look for \"stroke\" or \"telestroke\" for your site.    "

## 2024-07-22 LAB
ATRIAL RATE - MUSE: 64 BPM
DIASTOLIC BLOOD PRESSURE - MUSE: NORMAL MMHG
INTERPRETATION ECG - MUSE: NORMAL
P AXIS - MUSE: 20 DEGREES
PR INTERVAL - MUSE: 152 MS
QRS DURATION - MUSE: 82 MS
QT - MUSE: 396 MS
QTC - MUSE: 408 MS
R AXIS - MUSE: 42 DEGREES
SYSTOLIC BLOOD PRESSURE - MUSE: NORMAL MMHG
T AXIS - MUSE: 28 DEGREES
VENTRICULAR RATE- MUSE: 64 BPM

## 2024-09-11 ENCOUNTER — TRANSCRIBE ORDERS (OUTPATIENT)
Dept: OTHER | Age: 54
End: 2024-09-11

## 2024-09-11 DIAGNOSIS — R91.8 PULMONARY NODULES: Primary | ICD-10-CM

## 2024-11-14 NOTE — TELEPHONE ENCOUNTER
RECORDS STATUS - ALL OTHER DIAGNOSIS      RECORDS RECEIVED FROM:    Appt Date: 11/18/2024    Pulmonary nodules     Action    Action Taken 11/14/2024 10:35AM KEMATY     I faxed over a request for imaging to       NOTES STATUS DETAILS   OFFICE NOTE from referring provider  Patrick Oliver DO    OFFICE NOTE from medical oncologist     DISCHARGE SUMMARY from hospital     DISCHARGE REPORT from the ER     OPERATIVE REPORT     MEDICATION LIST In EPIC    CLINICAL TRIAL TREATMENTS TO DATE     LABS     PATHOLOGY REPORTS     ANYTHING RELATED TO DIAGNOSIS [facility] Labs last updated on 9/26/2024   PATHOLOGY FEDEX TRACKING   TRACKING #:   GENONOMIC TESTING     TYPE:     IMAGING (NEED IMAGES & REPORT)     CT SCANS In PACS CT Chest 10/5/2023   XRAYS PACS 9/9/2024, 7/3/2023    ULTRASOUND     PET     IMAGE DISC FEDEX TRACKING   TRACKING #:

## 2024-11-15 ENCOUNTER — HOSPITAL ENCOUNTER (OUTPATIENT)
Dept: CT IMAGING | Facility: CLINIC | Age: 54
Discharge: HOME | End: 2024-11-15
Attending: INTERNAL MEDICINE | Admitting: INTERNAL MEDICINE
Payer: COMMERCIAL

## 2024-11-15 DIAGNOSIS — R91.1 PULMONARY NODULE: ICD-10-CM

## 2024-11-15 PROCEDURE — 71250 CT THORAX DX C-: CPT

## 2024-11-18 ENCOUNTER — PRE VISIT (OUTPATIENT)
Dept: ONCOLOGY | Facility: CLINIC | Age: 54
End: 2024-11-18
Payer: COMMERCIAL

## 2024-11-18 ENCOUNTER — ONCOLOGY VISIT (OUTPATIENT)
Dept: ONCOLOGY | Facility: CLINIC | Age: 54
End: 2024-11-18
Attending: EMERGENCY MEDICINE
Payer: COMMERCIAL

## 2024-11-18 VITALS
RESPIRATION RATE: 18 BRPM | HEIGHT: 64 IN | OXYGEN SATURATION: 99 % | SYSTOLIC BLOOD PRESSURE: 97 MMHG | WEIGHT: 168.5 LBS | DIASTOLIC BLOOD PRESSURE: 65 MMHG | TEMPERATURE: 97.3 F | BODY MASS INDEX: 28.77 KG/M2 | HEART RATE: 71 BPM

## 2024-11-18 DIAGNOSIS — R91.1 PULMONARY NODULE: Primary | ICD-10-CM

## 2024-11-18 DIAGNOSIS — R05.3 CHRONIC COUGH: ICD-10-CM

## 2024-11-18 PROCEDURE — 99213 OFFICE O/P EST LOW 20 MIN: CPT | Performed by: INTERNAL MEDICINE

## 2024-11-18 PROCEDURE — 99204 OFFICE O/P NEW MOD 45 MIN: CPT | Performed by: INTERNAL MEDICINE

## 2024-11-18 ASSESSMENT — PAIN SCALES - GENERAL: PAINLEVEL_OUTOF10: MODERATE PAIN (4)

## 2024-11-18 NOTE — PATIENT INSTRUCTIONS
The sneezing and cough does sound like allergies - I recommend an over the counter allergy medication like Zyrtec (green top, cetirizine) or Allegra (purple top, fexofenadine). The store brand of these is fine -     The lung nodule has not changed since 2023, so no need to do anything else, nothing to worry about.   CT chest November 15 2024: IMPRESSION: A few small bilateral pulmonary nodules measuring 0.4 cm  or smaller are unchanged for greater than one year, and are highly likely to be of benign etiology.

## 2024-11-18 NOTE — LETTER
11/18/2024      Gregory Richard  [address] [facility] [address]      Dear Colleague,    Thank you for referring your patient, Gregory Richard, to the [facility]. Please see a copy of my visit note below.      [facility]  [address]  [facility] [address]  Phone: [phone]  Fax: [phone]    [facility]    Patient:  Gregory Richard, Date of birth 1970  Date of Visit:  11/18/2024  Referring Provider Patrick Oliver      Assessment & Plan     This is a 53-year-old female with :    incidental indeterminate lung nodules.  They have been stable for a year and do not have any concerning features.  She does not have any identifiable risk factors for lung cancer.    Sneezing and cough-these are related to smells and seasonal outdoor exposures.  I recommended to over-the-counter antihistamines newer generation.  And showed her pictures of them.  Given the family history of asthma and the symptoms, montelukast daily might also be a reasonable therapy for her seasonal allergies and possible intermittent asthma    No follow-up with me as planned at this time    Medical Decision Making            Mell Chavarria MD             Reason for Visit  Gregory Richard is a 53 year old female who is referred by Dr. Oliver for lung nodules  Pulmonary HPI    -She reports prolonged cough, sneezing after smelling certain things, she gets a runny nose. Antihistamine helps  -A severe headache in July prompted her to seek emergency medical attention.  Imaging done during that emergency visit showed incidental indeterminate lung nodules.      Other active medical problems include:   -She has reported at least 2 very severe headaches that do not seem to have been diagnosed yet    ROS Pulmonary  Dyspnea: No, Cough: Yes, Chest pain: No, Wheezing: No, Sputum  Production: No, Hemoptysis: No  A complete ROS was otherwise negative except as noted in the HPI.    The patient was seen and examined by Mell Chavarria MD   Current Outpatient Medications   Medication Sig Dispense Refill     ibuprofen (ADVIL/MOTRIN) 600 MG tablet Take 600 mg by mouth       Multiple Vitamin (MULTIVITAMIN ADULT PO)        No current facility-administered medications for this visit.     No Known Allergies  Social History     Socioeconomic History     Marital status:      Spouse name: Not on file     Number of children: Not on file     Years of education: Not on file     Highest education level: Not on file   Occupational History     Not on file   Tobacco Use     Smoking status: Never     Smokeless tobacco: Never   Substance and Sexual Activity     Alcohol use: Not on file     Drug use: Not on file     Sexual activity: Not on file   Other Topics Concern     Not on file   Social History Narrative    She is not working because of a shoulder injury.  She immigrated to the  from [place] about 6 years ago     Social Drivers of Health     Financial Resource Strain: Not At Risk (7/28/2023)    Received from [employer], [facility]    Financial Resource Strain      Is it hard for you to pay for the very basics like food, housing, medical care or heating?: No   Food Insecurity: No Food Insecurity (9/5/2024)    Received from [employer]    Hunger Vital Sign      Worried About Running Out of Food in the Last Year: Never true      Ran Out of Food in the Last Year: Never true   Transportation Needs: No Transportation Needs (9/5/2024)    [facility]    PRAPARE - Transportation      Lack of Transportation (Medical): No      Lack of Transportation (Non-Medical): No   Physical Activity: Not on file   Stress: Not on file   Social Connections: Unknown (1/1/2022)    Received from [facility], [facility] & [facility]  "Connections      Frequency of Communication with Friends and Family: Not on file   Interpersonal Safety: Not on file   Housing Stability: Unknown (9/5/2024)    [facility]    Housing Stability Vital Sign      Unable to Pay for Housing in the Last Year: Not on file      Number of Times Moved in the Last Year: Not on file      Homeless in the Last Year: No     History reviewed. No pertinent past medical history.  No past surgical history on file.  Family History   Problem Relation Age of Onset     Asthma Maternal Grandmother        Exam:   BP 97/65   Pulse 71   Temp 97.3  F (36.3  C) (Temporal)   Resp 18   Ht 1.626 m (5' 4\")   Wt 76.4 kg (168 lb 8 oz)   SpO2 99%   BMI 28.92 kg/m    GENERAL APPEARANCE: Well developed, well nourished, alert, and in no apparent distress.  RESP: normal percussion, good air flow throughout.  No crackles. No rhonchi. No wheezes.  PSYCH: mentation appears normal. and affect normal/bright  Results:  - My interpretation of the images relevant for this visit includes: CT chest images reviewed November and July 2024 and compared to October 2023 I agree with the radiologist interpretation there are several small nodules 4 mm or smaller that are stable for a year  - My interpretation of the PFT's relevant for this visit includes: None       Again, thank you for allowing me to participate in the care of your patient.        Sincerely,        Mell Chavarria MD  "

## 2024-11-18 NOTE — PROGRESS NOTES
[facility] [address]  [facility] MEDICAL CTR [facility] [address]  Phone: [phone]  Fax: [phone]    [facility] Initial Visit    Patient:  Gregory Richard, Date of birth 1970  Date of Visit:  11/18/2024  Referring Provider Patrick Oliver      Assessment & Plan      This is a 53-year-old female with :    incidental indeterminate lung nodules.  They have been stable for a year and do not have any concerning features.  She does not have any identifiable risk factors for lung cancer.    Sneezing and cough-these are related to smells and seasonal outdoor exposures.  I recommended to over-the-counter antihistamines newer generation.  And showed her pictures of them.  Given the family history of asthma and the symptoms, montelukast daily might also be a reasonable therapy for her seasonal allergies and possible intermittent asthma    No follow-up with me as planned at this time    Medical Decision Making             Mell Chavarria MD             Reason for Visit  Gregory Richard is a 53 year old female who is referred by Dr. Oliver for lung nodules  Pulmonary HPI    -She reports prolonged cough, sneezing after smelling certain things, she gets a runny nose. Antihistamine helps  -A severe headache in July prompted her to seek emergency medical attention.  Imaging done during that emergency visit showed incidental indeterminate lung nodules.      Other active medical problems include:   -She has reported at least 2 very severe headaches that do not seem to have been diagnosed yet    ROS Pulmonary  Dyspnea: No, Cough: Yes, Chest pain: No, Wheezing: No, Sputum Production: No, Hemoptysis: No  A complete ROS was otherwise negative except as noted in the HPI.    The patient was seen and examined by Mell Chavarria MD   Current Outpatient Medications   Medication Sig Dispense Refill    ibuprofen (ADVIL/MOTRIN)  600 MG tablet Take 600 mg by mouth      Multiple Vitamin (MULTIVITAMIN ADULT PO)        No current facility-administered medications for this visit.     No Known Allergies  Social History     Socioeconomic History    Marital status:      Spouse name: Not on file    Number of children: Not on file    Years of education: Not on file    Highest education level: Not on file   Occupational History    Not on file   Tobacco Use    Smoking status: Never    Smokeless tobacco: Never   Substance and Sexual Activity    Alcohol use: Not on file    Drug use: Not on file    Sexual activity: Not on file   Other Topics Concern    Not on file   Social History Narrative    She is not working because of a shoulder injury.  She immigrated to the  from [place] about 6 years ago     Social Drivers of Health     Financial Resource Strain: Not At Risk (7/28/2023)    Received from [employer], [facility]    Financial Resource Strain     Is it hard for you to pay for the very basics like food, housing, medical care or heating?: No   Food Insecurity: No Food Insecurity (9/5/2024)    Received from [employer]    Hunger Vital Sign     Worried About Running Out of Food in the Last Year: Never true     Ran Out of Food in the Last Year: Never true   Transportation Needs: No Transportation Needs (9/5/2024)    [facility]    PRAPARE - Transportation     Lack of Transportation (Medical): No     Lack of Transportation (Non-Medical): No   Physical Activity: Not on file   Stress: Not on file   Social Connections: Unknown (1/1/2022)    Received from [facility], [facility] & [facility]    Social Connections     Frequency of Communication with Friends and Family: Not on file   Interpersonal Safety: Not on file   Housing Stability: Unknown (9/5/2024)    Received from [employer]    Housing Stability Vital Sign     Unable to Pay for Housing in the Last Year: Not on  "file     Number of Times Moved in the Last Year: Not on file     Homeless in the Last Year: No     History reviewed. No pertinent past medical history.  No past surgical history on file.  Family History   Problem Relation Age of Onset    Asthma Maternal Grandmother        Exam:   BP 97/65   Pulse 71   Temp 97.3  F (36.3  C) (Temporal)   Resp 18   Ht 1.626 m (5' 4\")   Wt 76.4 kg (168 lb 8 oz)   SpO2 99%   BMI 28.92 kg/m    GENERAL APPEARANCE: Well developed, well nourished, alert, and in no apparent distress.  RESP: normal percussion, good air flow throughout.  No crackles. No rhonchi. No wheezes.  PSYCH: mentation appears normal. and affect normal/bright  Results:  - My interpretation of the images relevant for this visit includes: CT chest images reviewed November and July 2024 and compared to October 2023 I agree with the radiologist interpretation there are several small nodules 4 mm or smaller that are stable for a year  - My interpretation of the PFT's relevant for this visit includes: None     "

## 2024-11-18 NOTE — NURSING NOTE
"Oncology Rooming Note    November 18, 2024 8:33 AM   Gregory Richard is a 53 year old female who presents for:    Chief Complaint   Patient presents with    Lung Nodule     Initial Vitals: BP 97/65   Pulse 71   Temp 97.3  F (36.3  C) (Temporal)   Resp 18   Ht 1.626 m (5' 4\")   Wt 76.4 kg (168 lb 8 oz)   SpO2 99%   BMI 28.92 kg/m   Estimated body mass index is 28.92 kg/m  as calculated from the following:    Height as of this encounter: 1.626 m (5' 4\").    Weight as of this encounter: 76.4 kg (168 lb 8 oz). Body surface area is 1.86 meters squared.  Moderate Pain (4) Comment: Data Unavailable   No LMP recorded. Patient is perimenopausal.  Allergies reviewed: Yes  Medications reviewed: Yes    Medications: Medication refills not needed today.  Pharmacy name entered into [email or web address]: [facility] #[id number] - [address] AT [facility] & [facility] [address]    Frailty Screening:   Is the patient here for a new oncology consult visit in cancer care? 1. Yes. Over the past month, have you experienced difficulty or required a caregiver to assist with:   1. Balance, walking or general mobility (including any falls)? NO  2. Completion of self-care tasks such as bathing, dressing, toileting, grooming/hygiene?  NO  3. Concentration or memory that affects your daily life?  NO       Clinical concerns: New Pt       Yajaira Kamara, SHAN              "